# Patient Record
Sex: FEMALE | Race: ASIAN | NOT HISPANIC OR LATINO
[De-identification: names, ages, dates, MRNs, and addresses within clinical notes are randomized per-mention and may not be internally consistent; named-entity substitution may affect disease eponyms.]

---

## 2019-07-31 ENCOUNTER — APPOINTMENT (OUTPATIENT)
Dept: ORTHOPEDIC SURGERY | Facility: CLINIC | Age: 64
End: 2019-07-31

## 2019-08-13 ENCOUNTER — APPOINTMENT (OUTPATIENT)
Dept: ORTHOPEDIC SURGERY | Facility: CLINIC | Age: 64
End: 2019-08-13
Payer: MEDICARE

## 2019-08-13 VITALS
BODY MASS INDEX: 24.1 KG/M2 | DIASTOLIC BLOOD PRESSURE: 82 MMHG | HEIGHT: 63 IN | SYSTOLIC BLOOD PRESSURE: 124 MMHG | HEART RATE: 70 BPM | WEIGHT: 136 LBS

## 2019-08-13 DIAGNOSIS — Z80.9 FAMILY HISTORY OF MALIGNANT NEOPLASM, UNSPECIFIED: ICD-10-CM

## 2019-08-13 DIAGNOSIS — Z78.9 OTHER SPECIFIED HEALTH STATUS: ICD-10-CM

## 2019-08-13 DIAGNOSIS — M19.041 PRIMARY OSTEOARTHRITIS, RIGHT HAND: ICD-10-CM

## 2019-08-13 PROCEDURE — 99203 OFFICE O/P NEW LOW 30 MIN: CPT

## 2019-08-13 PROCEDURE — 73130 X-RAY EXAM OF HAND: CPT | Mod: LT

## 2019-08-13 RX ORDER — OSPEMIFENE 60 MG/1
TABLET, FILM COATED ORAL
Refills: 0 | Status: ACTIVE | COMMUNITY

## 2019-08-13 NOTE — HISTORY OF PRESENT ILLNESS
[Right] : right hand dominant [FreeTextEntry1] : She comes in today for evaluation of a left thumb mucous cyst.  She has had this for more than one year.  It has popped on its own and has recurred.  It causes her some discomfort.  She also has questions regarding bilateral hand pain secondary to arthritis.\par \par She was referred by Dr. Yeny Negrete.\par \par She has taken Mobic, as prescribed by Dr. Negrete, without significant improvement.

## 2019-08-13 NOTE — DISCUSSION/SUMMARY
[FreeTextEntry1] : She has findings consistent with a left thumb mucous cyst, as well as mild bilateral hand secondary to osteoarthritis.\par \par I had a discussion regarding today's visit, the diagnosis and treatment recommendations / options.\par \par - With regard to her arthritis, as she has not noted improvement with the Mobic, I recommend followup with Dr. Negrete to discuss other medications.  Her radiographs only demonstrate mild degenerative changes.\par \par - With regard to the left thumb mucous cyst, as it is causing her more symptoms and has increased in size, she would like to go ahead and schedule surgical excision.  She will be scheduled in the near future.\par \par -  The nature and purposes of the operation/procedure was discussed in detail.  I discussed the surgical procedure in detail, as well as expected postoperative recovery and outcome.\par -  Possible risks, benefits, and complications (from known and unknown causes) of the procedure were discussed in detail.  \par -  Possible non-operative alternatives to the proposed treatment were discussed in detail.  \par -  She was told that possible risks/complications include, but are not limited to:  Infection, nerve or vessel injury, stiffness, painful scar, poor outcome, need for additional surgical procedures, and other unforeseen complications.  \par -  In addition, the possibility of an "unsuccessful outcome," despite "successful surgery," was discussed with her.  Specifically, she understands that there is approximately a 10% chance of recurrence even after successful excision of the cyst.  \par -  She fully understands these risks and wishes to proceed.  \par -  I had a lengthy discussion with the patient regarding today's visit, the diagnosis, and my surgical treatment recommendations.  The patient has agreed to this plan of management and has expressed full understanding.  All questions were fully answered to the patient's satisfaction. \par \par Over 50% of the time spent with the patient was on counseling the patient on the above diagnosis, treatment plan and prognosis.

## 2019-08-13 NOTE — PHYSICAL EXAM
[de-identified] : - Constitutional: This is a female in no obvious distress.  \par - Psych: Patient is alert and oriented to person, place and time.  Patient has a normal mood and affect.\par - Cardiovascular: Normal pulses throughout the upper extremities.  No significant varicosities are noted in the upper extremities. \par - Neuro: Strength and sensation are intact throughout the upper extremities.  Patient has normal coordination.\par - Respiratory:  Patient exhibits no evidence of shortness of breath or difficulty breathing.\par - Skin: No rashes, lesions, or other abnormalities are noted in the upper extremities.\par \par ---\par \par Examination of both hands demonstrates arthritis at the DIP and PIP joints.  There is mild joint line tenderness and mild swelling.  This is most notable at the right middle finger PIP joint.  She has evidence of a mucous cyst emanating from the dorsal aspect of the left thumb.  There is no drainage, or suggestion of infection.  She can flex the digits into the palm.  She is neurovascularly intact distally. [de-identified] : AP, lateral, and oblique radiographs of both hands demonstrate mild degenerative changes at the PIP and DIP joints, and a very small spur along the dorsal base of the left thumb distal phalanx at the IP joint.

## 2019-08-28 ENCOUNTER — OUTPATIENT (OUTPATIENT)
Dept: OUTPATIENT SERVICES | Facility: HOSPITAL | Age: 64
LOS: 1 days | End: 2019-08-28
Payer: MEDICARE

## 2019-08-28 VITALS
TEMPERATURE: 98 F | DIASTOLIC BLOOD PRESSURE: 72 MMHG | WEIGHT: 138.89 LBS | RESPIRATION RATE: 14 BRPM | HEIGHT: 62 IN | HEART RATE: 61 BPM | OXYGEN SATURATION: 97 % | SYSTOLIC BLOOD PRESSURE: 112 MMHG

## 2019-08-28 DIAGNOSIS — M67.442 GANGLION, LEFT HAND: ICD-10-CM

## 2019-08-28 DIAGNOSIS — Z01.818 ENCOUNTER FOR OTHER PREPROCEDURAL EXAMINATION: ICD-10-CM

## 2019-08-28 LAB
ALBUMIN SERPL ELPH-MCNC: 3.9 G/DL — SIGNIFICANT CHANGE UP (ref 3.3–5)
ALP SERPL-CCNC: 65 U/L — SIGNIFICANT CHANGE UP (ref 30–120)
ALT FLD-CCNC: 32 U/L DA — SIGNIFICANT CHANGE UP (ref 10–60)
ANION GAP SERPL CALC-SCNC: 7 MMOL/L — SIGNIFICANT CHANGE UP (ref 5–17)
AST SERPL-CCNC: 16 U/L — SIGNIFICANT CHANGE UP (ref 10–40)
BILIRUB SERPL-MCNC: 0.5 MG/DL — SIGNIFICANT CHANGE UP (ref 0.2–1.2)
BUN SERPL-MCNC: 19 MG/DL — SIGNIFICANT CHANGE UP (ref 7–23)
CALCIUM SERPL-MCNC: 9.7 MG/DL — SIGNIFICANT CHANGE UP (ref 8.4–10.5)
CHLORIDE SERPL-SCNC: 105 MMOL/L — SIGNIFICANT CHANGE UP (ref 96–108)
CO2 SERPL-SCNC: 29 MMOL/L — SIGNIFICANT CHANGE UP (ref 22–31)
CREAT SERPL-MCNC: 0.75 MG/DL — SIGNIFICANT CHANGE UP (ref 0.5–1.3)
GLUCOSE SERPL-MCNC: 105 MG/DL — HIGH (ref 70–99)
HCT VFR BLD CALC: 40.6 % — SIGNIFICANT CHANGE UP (ref 34.5–45)
HGB BLD-MCNC: 13.6 G/DL — SIGNIFICANT CHANGE UP (ref 11.5–15.5)
MCHC RBC-ENTMCNC: 31.4 PG — SIGNIFICANT CHANGE UP (ref 27–34)
MCHC RBC-ENTMCNC: 33.5 GM/DL — SIGNIFICANT CHANGE UP (ref 32–36)
MCV RBC AUTO: 93.8 FL — SIGNIFICANT CHANGE UP (ref 80–100)
NRBC # BLD: 0 /100 WBCS — SIGNIFICANT CHANGE UP (ref 0–0)
PLATELET # BLD AUTO: 210 K/UL — SIGNIFICANT CHANGE UP (ref 150–400)
POTASSIUM SERPL-MCNC: 3.8 MMOL/L — SIGNIFICANT CHANGE UP (ref 3.5–5.3)
POTASSIUM SERPL-SCNC: 3.8 MMOL/L — SIGNIFICANT CHANGE UP (ref 3.5–5.3)
PROT SERPL-MCNC: 7.6 G/DL — SIGNIFICANT CHANGE UP (ref 6–8.3)
RBC # BLD: 4.33 M/UL — SIGNIFICANT CHANGE UP (ref 3.8–5.2)
RBC # FLD: 12.8 % — SIGNIFICANT CHANGE UP (ref 10.3–14.5)
SODIUM SERPL-SCNC: 141 MMOL/L — SIGNIFICANT CHANGE UP (ref 135–145)
WBC # BLD: 5.47 K/UL — SIGNIFICANT CHANGE UP (ref 3.8–10.5)
WBC # FLD AUTO: 5.47 K/UL — SIGNIFICANT CHANGE UP (ref 3.8–10.5)

## 2019-08-28 PROCEDURE — 85027 COMPLETE CBC AUTOMATED: CPT

## 2019-08-28 PROCEDURE — 93005 ELECTROCARDIOGRAM TRACING: CPT

## 2019-08-28 PROCEDURE — 93010 ELECTROCARDIOGRAM REPORT: CPT

## 2019-08-28 PROCEDURE — G0463: CPT

## 2019-08-28 PROCEDURE — 36415 COLL VENOUS BLD VENIPUNCTURE: CPT

## 2019-08-28 PROCEDURE — 80053 COMPREHEN METABOLIC PANEL: CPT

## 2019-08-28 NOTE — H&P PST ADULT - NSICDXPROBLEM_GEN_ALL_CORE_FT
PROBLEM DIAGNOSES  Problem: Mucous cyst of digit of left hand  Assessment and Plan: excision left thumb mucous cyst

## 2019-08-28 NOTE — H&P PST ADULT - HISTORY OF PRESENT ILLNESS
this is a 65 y/o female who has had a mass left thumb for 9 months; it recently popped and was flat and then came back; it hurts; to have removal of mass left thumb

## 2019-08-29 ENCOUNTER — TRANSCRIPTION ENCOUNTER (OUTPATIENT)
Age: 64
End: 2019-08-29

## 2019-09-16 ENCOUNTER — TRANSCRIPTION ENCOUNTER (OUTPATIENT)
Age: 64
End: 2019-09-16

## 2019-09-17 ENCOUNTER — OUTPATIENT (OUTPATIENT)
Dept: OUTPATIENT SERVICES | Facility: HOSPITAL | Age: 64
LOS: 1 days | End: 2019-09-17
Payer: MEDICARE

## 2019-09-17 ENCOUNTER — RESULT REVIEW (OUTPATIENT)
Age: 64
End: 2019-09-17

## 2019-09-17 ENCOUNTER — APPOINTMENT (OUTPATIENT)
Dept: ORTHOPEDIC SURGERY | Facility: HOSPITAL | Age: 64
End: 2019-09-17

## 2019-09-17 VITALS
SYSTOLIC BLOOD PRESSURE: 104 MMHG | OXYGEN SATURATION: 100 % | DIASTOLIC BLOOD PRESSURE: 53 MMHG | HEART RATE: 65 BPM | RESPIRATION RATE: 14 BRPM

## 2019-09-17 VITALS
SYSTOLIC BLOOD PRESSURE: 107 MMHG | TEMPERATURE: 98 F | HEART RATE: 59 BPM | HEIGHT: 63 IN | DIASTOLIC BLOOD PRESSURE: 64 MMHG | RESPIRATION RATE: 15 BRPM | WEIGHT: 135.8 LBS | OXYGEN SATURATION: 98 %

## 2019-09-17 DIAGNOSIS — Z98.890 OTHER SPECIFIED POSTPROCEDURAL STATES: Chronic | ICD-10-CM

## 2019-09-17 DIAGNOSIS — M67.442 GANGLION, LEFT HAND: ICD-10-CM

## 2019-09-17 PROCEDURE — 26160 REMOVE TENDON SHEATH LESION: CPT | Mod: FA

## 2019-09-17 PROCEDURE — 88305 TISSUE EXAM BY PATHOLOGIST: CPT | Mod: 26

## 2019-09-17 PROCEDURE — 88305 TISSUE EXAM BY PATHOLOGIST: CPT

## 2019-09-17 RX ORDER — ONDANSETRON 8 MG/1
4 TABLET, FILM COATED ORAL ONCE
Refills: 0 | Status: DISCONTINUED | OUTPATIENT
Start: 2019-09-17 | End: 2019-09-17

## 2019-09-17 RX ORDER — OXYCODONE AND ACETAMINOPHEN 5; 325 MG/1; MG/1
1 TABLET ORAL ONCE
Refills: 0 | Status: DISCONTINUED | OUTPATIENT
Start: 2019-09-17 | End: 2019-09-17

## 2019-09-17 RX ORDER — CEFAZOLIN SODIUM 1 G
2000 VIAL (EA) INJECTION ONCE
Refills: 0 | Status: COMPLETED | OUTPATIENT
Start: 2019-09-17 | End: 2019-09-17

## 2019-09-17 RX ORDER — CHLORHEXIDINE GLUCONATE 213 G/1000ML
1 SOLUTION TOPICAL ONCE
Refills: 0 | Status: COMPLETED | OUTPATIENT
Start: 2019-09-17 | End: 2019-09-17

## 2019-09-17 RX ORDER — SODIUM CHLORIDE 9 MG/ML
1000 INJECTION, SOLUTION INTRAVENOUS
Refills: 0 | Status: DISCONTINUED | OUTPATIENT
Start: 2019-09-17 | End: 2019-09-17

## 2019-09-17 RX ORDER — HYDROMORPHONE HYDROCHLORIDE 2 MG/ML
0.5 INJECTION INTRAMUSCULAR; INTRAVENOUS; SUBCUTANEOUS
Refills: 0 | Status: DISCONTINUED | OUTPATIENT
Start: 2019-09-17 | End: 2019-09-17

## 2019-09-17 RX ORDER — ACETAMINOPHEN WITH CODEINE 300MG-30MG
1 TABLET ORAL
Qty: 5 | Refills: 0
Start: 2019-09-17

## 2019-09-17 RX ADMIN — CHLORHEXIDINE GLUCONATE 1 APPLICATION(S): 213 SOLUTION TOPICAL at 08:41

## 2019-09-17 RX ADMIN — SODIUM CHLORIDE 75 MILLILITER(S): 9 INJECTION, SOLUTION INTRAVENOUS at 10:40

## 2019-09-17 NOTE — ASU DISCHARGE PLAN (ADULT/PEDIATRIC) - CALL YOUR DOCTOR IF YOU HAVE ANY OF THE FOLLOWING:
Fever greater than (need to indicate Fahrenheit or Celsius)/Pain not relieved by Medications/Numbness, tingling, color or temperature change to extremity/Bleeding that does not stop/Swelling that gets worse/Wound/Surgical Site with redness, or foul smelling discharge or pus

## 2019-09-17 NOTE — ASU DISCHARGE PLAN (ADULT/PEDIATRIC) - CARE PROVIDER_API CALL
Ankur Mazariegos)  Orthopaedic Surgery; Surgery of the Hand  [address], [facility] [address]  [place] [address]  Phone: [phone]  Fax: [phone]  Follow Up Time:

## 2019-09-18 PROBLEM — E04.1 NONTOXIC SINGLE THYROID NODULE: Chronic | Status: ACTIVE | Noted: 2019-09-17

## 2019-09-18 PROBLEM — M54.12 RADICULOPATHY, CERVICAL REGION: Chronic | Status: ACTIVE | Noted: 2019-09-17

## 2019-09-18 PROBLEM — M81.0 AGE-RELATED OSTEOPOROSIS WITHOUT CURRENT PATHOLOGICAL FRACTURE: Chronic | Status: ACTIVE | Noted: 2019-08-28

## 2019-09-18 PROBLEM — K21.9 GASTRO-ESOPHAGEAL REFLUX DISEASE WITHOUT ESOPHAGITIS: Chronic | Status: ACTIVE | Noted: 2019-09-17

## 2019-09-18 PROBLEM — E78.5 HYPERLIPIDEMIA, UNSPECIFIED: Chronic | Status: ACTIVE | Noted: 2019-08-28

## 2019-09-18 PROBLEM — R73.03 PREDIABETES: Chronic | Status: ACTIVE | Noted: 2019-09-17

## 2019-09-20 ENCOUNTER — APPOINTMENT (OUTPATIENT)
Dept: ORTHOPEDIC SURGERY | Facility: CLINIC | Age: 64
End: 2019-09-20
Payer: MEDICARE

## 2019-09-20 PROCEDURE — 99024 POSTOP FOLLOW-UP VISIT: CPT

## 2019-09-20 NOTE — HISTORY OF PRESENT ILLNESS
[de-identified] : 3 days postoperative. [de-identified] : 3 days status post excision of left thumb mucous cyst.  She is doing well and has minimal discomfort. [de-identified] : Examination of her left thumb after the dressing was removed demonstrates her incision to be clean and dry.  There is minimal swelling.  There is no drainage or evidence of infection.  She is neurovascularly intact distally. [de-identified] : Stable, 3 days postoperative. [de-identified] : A dressing was applied.  She was instructed on local wound care and gentle range of motion exercises.  She will try to keep the incision clean and dry for 1 more week.  She will follow-up in 2 weeks.

## 2019-09-30 ENCOUNTER — CLINICAL ADVICE (OUTPATIENT)
Age: 64
End: 2019-09-30

## 2019-10-04 ENCOUNTER — APPOINTMENT (OUTPATIENT)
Dept: ORTHOPEDIC SURGERY | Facility: CLINIC | Age: 64
End: 2019-10-04
Payer: MEDICARE

## 2019-10-04 PROCEDURE — 99024 POSTOP FOLLOW-UP VISIT: CPT

## 2019-10-04 NOTE — HISTORY OF PRESENT ILLNESS
[de-identified] : 17 days postoperative. [de-identified] : 17 days status post excision of left thumb mucous cyst.  She states that the incision partially opened up earlier this week.  She has been applying hydrogen peroxide and is improved. [de-identified] : Examination of her left thumb demonstrates her incision to healing well.  There is one area where the skin was thinned out which has an overlying scab.  There is no drainage or evidence of infection.  There is decreased swelling.  There is no drainage or evidence of infection.  She is neurovascularly intact distally. [de-identified] : Stable, 17 days postoperative. [de-identified] : I reassured her that the incision is healing well.  The remaining sutures were removed.  She was instructed on range of motion exercises and scar massage and desensitization.  She will follow-up in 4 weeks.  She will follow-up before then if she is having any other problems or issues.

## 2019-11-01 ENCOUNTER — APPOINTMENT (OUTPATIENT)
Dept: ORTHOPEDIC SURGERY | Facility: CLINIC | Age: 64
End: 2019-11-01
Payer: MEDICARE

## 2019-11-01 PROCEDURE — 99024 POSTOP FOLLOW-UP VISIT: CPT

## 2019-11-01 NOTE — HISTORY OF PRESENT ILLNESS
[de-identified] : 6 weeks and 3 days postoperative. [de-identified] : 6 weeks and 3 days status post excision of left thumb mucous cyst. \par \par She is still having some pain and has noted persistent swelling. [de-identified] : Examination of her left thumb demonstrates her incision to healing well.  There is residual swelling.  It is difficult to determine if this is secondary to fluid or scar tissue.  There is tenderness.  There is no evidence of infection.  There is decreased sensation to light touch directly over the incision, but she is neurovascularly intact distally. [de-identified] : 6 weeks and 3 days postoperative, with persistent hypersensitivity and swelling. [de-identified] : Using sterile technique, I aspirated the area of swelling.  There is no evidence of ganglion fluid.  Small amount of blood was expressed.  A bandage was applied.\par \par I recommended continued scar massage and desensitization.  I told her that the swelling most likely represents localized scar tissue.  The possibility of recurrence of the cyst was discussed.  She will follow-up in 4 weeks.  If the swelling is not improved, then I would possibly suggest a cortisone injection.  If she has increased pain or swelling in the interim, then she was instructed to return to the office before then.

## 2019-11-19 PROBLEM — M19.042 ARTHRITIS OF HAND, LEFT: Status: ACTIVE | Noted: 2019-08-13

## 2019-11-22 ENCOUNTER — APPOINTMENT (OUTPATIENT)
Dept: ORTHOPEDIC SURGERY | Facility: CLINIC | Age: 64
End: 2019-11-22
Payer: MEDICARE

## 2019-11-22 DIAGNOSIS — M19.042 PRIMARY OSTEOARTHRITIS, LEFT HAND: ICD-10-CM

## 2019-11-22 PROCEDURE — 99024 POSTOP FOLLOW-UP VISIT: CPT

## 2019-11-22 NOTE — HISTORY OF PRESENT ILLNESS
[de-identified] : 9 weeks and 3 days postoperative. [de-identified] : 9 weeks and 3 days status post excision of left thumb mucous cyst. \par \par She still has swelling but has noted some improvement. [de-identified] : Examination of her left thumb demonstrates her incision to healing well.  There is residual, although decreased swelling.  She appears to have a hypertrophic scar..  There is no evidence of infection.  There is decreased sensation to light touch directly over the incision, but she is neurovascularly intact distally. [de-identified] : 9 weeks and 3 days postoperative, with persistent swelling hypersensitivity, but some improvement. [de-identified] : We discussed further treatment options.  At this time she agreed to proceed with a cortisone injection to see if this will help break up the scar.  She did tell me that she formed keloids on lacerations at her face which responded to cortisone injections.\par \par Using sterile technique, the area of the scar was injected with 1/2 cc of Celestone.  She will restart scar massage and desensitization next week and will follow-up in 4 weeks to assess her progress.\par \par Finally, she asked me regarding her right middle finger where she has arthritis at the PIP joint.  Symptomatic treatment was discussed.  I did tell her that if her symptoms worsen, we could try a cortisone injection.

## 2019-11-22 NOTE — HISTORY OF PRESENT ILLNESS
[de-identified] : 9 weeks and 3 days postoperative. [de-identified] : 9 weeks and 3 days status post excision of left thumb mucous cyst. \par \par She still has swelling but has noted some improvement. [de-identified] : Examination of her left thumb demonstrates her incision to healing well.  There is residual, although decreased swelling.  She appears to have a hypertrophic scar..  There is no evidence of infection.  There is decreased sensation to light touch directly over the incision, but she is neurovascularly intact distally. [de-identified] : 9 weeks and 3 days postoperative, with persistent swelling hypersensitivity, but some improvement. [de-identified] : We discussed further treatment options.  At this time she agreed to proceed with a cortisone injection to see if this will help break up the scar.  She did tell me that she formed keloids on lacerations at her face which responded to cortisone injections.\par \par Using sterile technique, the area of the scar was injected with 1/2 cc of Celestone.  She will restart scar massage and desensitization next week and will follow-up in 4 weeks to assess her progress.\par \par Finally, she asked me regarding her right middle finger where she has arthritis at the PIP joint.  Symptomatic treatment was discussed.  I did tell her that if her symptoms worsen, we could try a cortisone injection.

## 2019-12-11 ENCOUNTER — APPOINTMENT (OUTPATIENT)
Dept: ORTHOPEDIC SURGERY | Facility: CLINIC | Age: 64
End: 2019-12-11

## 2019-12-18 PROBLEM — M67.442 MUCOUS CYST OF DIGIT OF LEFT HAND: Status: ACTIVE | Noted: 2019-08-13

## 2019-12-20 ENCOUNTER — APPOINTMENT (OUTPATIENT)
Dept: ORTHOPEDIC SURGERY | Facility: CLINIC | Age: 64
End: 2019-12-20

## 2019-12-20 DIAGNOSIS — M67.442 GANGLION, LEFT HAND: ICD-10-CM
